# Patient Record
Sex: MALE | Race: WHITE
[De-identification: names, ages, dates, MRNs, and addresses within clinical notes are randomized per-mention and may not be internally consistent; named-entity substitution may affect disease eponyms.]

---

## 2018-04-06 ENCOUNTER — HOSPITAL ENCOUNTER (EMERGENCY)
Dept: HOSPITAL 68 - ERH | Age: 83
End: 2018-04-06
Payer: COMMERCIAL

## 2018-04-06 VITALS — BODY MASS INDEX: 28.63 KG/M2 | HEIGHT: 73 IN | WEIGHT: 216 LBS

## 2018-04-06 VITALS — DIASTOLIC BLOOD PRESSURE: 80 MMHG | SYSTOLIC BLOOD PRESSURE: 182 MMHG

## 2018-04-06 DIAGNOSIS — M25.551: Primary | ICD-10-CM

## 2018-04-06 DIAGNOSIS — K21.9: ICD-10-CM

## 2018-04-06 DIAGNOSIS — I48.91: ICD-10-CM

## 2018-04-06 DIAGNOSIS — Z86.73: ICD-10-CM

## 2018-04-06 DIAGNOSIS — Z95.0: ICD-10-CM

## 2018-04-06 LAB
ABSOLUTE GRANULOCYTE CT: 5.5 /CUMM (ref 1.4–6.5)
APTT BLD: 35 SEC (ref 25–37)
BASOPHILS # BLD: 0.1 /CUMM (ref 0–0.2)
BASOPHILS NFR BLD: 0.9 % (ref 0–2)
EOSINOPHIL # BLD: 0.2 /CUMM (ref 0–0.7)
EOSINOPHIL NFR BLD: 2.8 % (ref 0–5)
ERYTHROCYTE [DISTWIDTH] IN BLOOD BY AUTOMATED COUNT: 13.9 % (ref 11.5–14.5)
GRANULOCYTES NFR BLD: 65.6 % (ref 42.2–75.2)
HCT VFR BLD CALC: 42.2 % (ref 42–52)
LYMPHOCYTES # BLD: 1.9 /CUMM (ref 1.2–3.4)
MCH RBC QN AUTO: 31.9 PG (ref 27–31)
MCHC RBC AUTO-ENTMCNC: 32.4 G/DL (ref 33–37)
MCV RBC AUTO: 98.4 FL (ref 80–94)
MONOCYTES # BLD: 0.7 /CUMM (ref 0.1–0.6)
PLATELET # BLD: 226 /CUMM (ref 130–400)
PMV BLD AUTO: 8.1 FL (ref 7.4–10.4)
PROTHROMBIN TIME: 21 SEC (ref 9.4–12.5)
RED BLOOD CELL CT: 4.29 /CUMM (ref 4.7–6.1)
WBC # BLD AUTO: 8.4 /CUMM (ref 4.8–10.8)

## 2018-04-06 NOTE — CT SCAN REPORT
STUDY PERFORMED:
CTA OF THE CHEST, ABDOMEN AND PELVIS WITH AND WITHOUT CONTRAST
 
CLINICAL INFORMATION:
Further evaluation of abdominal aortic aneurysm.
 
DESCRIPTION:
Initial noncontrast CT of the chest was performed.   Contrast timing was
performed at the level of the distal descending thoracic aorta. Subsequently,
arterial phase multidetector volumetric imaging was performed through the
chest, abdomen and pelvis following the administration of 95 mL Optiray 320
intravenous contrast.
No contrast reaction reported
Sagittal and coronal reformatted images were obtained on the technologist
workstation.
Three-dimensional MIP reformatted imaging was performed and reviewed.
 
Total exam dose-length product 1665 mGy-cm
 
COMPARISON:
Pelvic CT from today. Report of the lumbar spine CT performed 05/22/2016.
 
FINDINGS:
Vascular:
1. Normal origin of the main coronary arteries. The ascending thoracic aorta
is normal in course and caliber without dissection.
2. The aortic arch is normal in course and caliber with normal 3 vessel
branching configuration. No dissection.
3. The descending thoracic aorta is normal in course and caliber without
dissection. Scattered atherosclerotic calcification.
4. The abdominal aortic aneurysm seen on the recent pelvic CT is again
demonstrated. This has a fusiform appearance with maximum dimensions of 4.9
cm AP by 5.4 cm transverse. The prior study from 2016 reports a maximum size
of 4.5 x 4.6 cm, suggesting interval increase. Peripheral noncalcified plaque
is seen at the left anterior aspect of the aneurysm. This originates below
the level of the renal arteries and does not extend into the iliac
vasculature.
5. The bilateral common iliac arteries through the visualized femoral
arteries are normal in caliber with scattered nonocclusive calcified plaque.
6. The celiac axis and superior mesenteric artery are widely patent. Stenosis
at the origin of the inferior mesenteric artery. There are single bilateral
renal arteries which are patent.
7. No central pulmonary embolism.
 
Nonvascular:
CHEST:
The central airways are patent. There is elevation of the left hemidiaphragm
with left basilar atelectasis. No additional consolidation. No pneumothorax
or pleural effusion.
 
The heart is of normal size. Cardiac pacer leads extend to the right atrium
and right ventricle. Trace pericardial effusion. There is no mediastinal,
hilar or axillary lymphadenopathy. Calcified mediastinal lymph nodes noted.
There are no chest wall masses.
 
ABDOMEN AND PELVIS:
LIVER, GALLBLADDER, AND BILIARY TREE: The liver is normal in size, shape, and
attenuation. No focal hepatic lesion or biliary ductal dilatation is present.
The gallbladder is unremarkable with no evidence of radiopaque gallstones,
gallbladder wall thickening, or obvious pericholecystic inflammatory changes.
 
 
PANCREAS: Mild atrophy with no gross abnormality.
 
SPLEEN: Normal in size with scattered tiny calcifications suggesting prior
granulomatous disease.
 
ADRENAL GLANDS: Unremarkable.
 
KIDNEYS AND URETERS: The kidneys are normal in size, shape, and attenuation.
No hydronephrosis, hydroureter, or calculi seen. No perinephric stranding.
Hypoattenuating lesions are seen bilaterally suggestive of small cysts.
 
BLADDER: Unremarkable.
 
GASTROINTESTINAL TRACT: The stomach is unremarkable. The small bowel is
normal in caliber. No obstruction. No colonic wall thickening or inflammatory
change. No free air or free fluid.
 
ABDOMINAL WALL: No significant hernia is appreciated.
 
LYMPH NODES: Normal.
 
PELVIC VISCERA: The prostate and seminal vesicles are unremarkable.
 
OSSEOUS STRUCTURES: No acute or suspicious osseous abnormality. Prior healed
left acetabular fracture again noted. Degenerative changes of the hips.
Advanced degenerative changes of the spine. DISH.
 
IMPRESSION:
Abdominal aortic aneurysm with maximum dimension of 4.9 x 5.4 cm, compared to
4.5 x 4.6 cm reported from the study of 05/22/2016.
 
Elevated left hemidiaphragm with left basilar atelectasis.
 
Findings suggestive of prior granulomatous disease.

## 2018-04-06 NOTE — RADIOLOGY REPORT
EXAMINATION:
XR HIP, RIGHT
 
CLINICAL INFORMATION:
Right hip pain
 
COMPARISON:
06/01/2014
 
TECHNIQUE:
Two views of the right hip.
 
FINDINGS:
Alignment across the hip is anatomic with mild degenerative change noted. No
evidence of acute fracture. Vascular calcification is present.
 
IMPRESSION:
No fracture identified. Mild degenerative changes.

## 2018-04-06 NOTE — ED UPPER/LOWER EXTREMITY COMPL
**See Addendum**
History of Present Illness
 
General
Chief Complaint: Hip Injury
Stated Complaint: PAIN IN RT HIP
Source: patient, family
Exam Limitations: no limitations
 
Vital Signs & Intake/Output
Vital Signs & Intake/Output
 Vital Signs
 
 
Date Time Temp Pulse Resp B/P B/P Pulse O2 O2 Flow FiO2
 
     Mean Ox Delivery Rate 
 
 1910 98.1 61 16 182/80  99 Room Air  
 
 1454 97.9 66 18 102/70  100 Room Air  
 
 1310      95 Room Air  
 
 1207 97.5 64 16 98/61  100 Room Air  
 
 
 
Allergies
Coded Allergies:
No Known Allergies (18)
 
Reconcile Medications
Alprazolam (Xanax) 0.5 MG TABLET   1 TAB PO BIDP PRN anxiety/restless leg  (
Reported)
Ferrous Sulfate 324 MG (65 MG IRON) TABLET.DR   1 TAB PO Q48 SUPPLEMENT  (
Reported)
Furosemide 20 MG TABLET   1 TAB PO DAILY WATER RETENTION  (Reported)
Gabapentin (Neurontin) 100 MG CAPSULE   1 CAP PO BID NEUROPATHY  (Reported)
Multivitamin (Daily Multiple Vitamin) 1 EACH TABLET   1 TAB PO DAILY VITAMIN 
SUPPORT  (Reported)
Polyethylene Glycol 3350 (Miralax) 17 GRAM/DOSE POWDER   17 GM PO DAILY 
constipation
     mix with water, juice, soda, coffee or tea until stools soft and
     regular
Rivaroxaban (Xarelto) 20 MG TABLET   1 TAB PO DAILY A.fib  (Reported)
     Please restart Xarelto in 1 week (16)
Tamsulosin HCl (Flomax) 0.4 MG CAP.ER.24H   0.4 MG PO DAILY prostate
Trazodone HCl 50 MG TABLET   1 TAB PO QPM SLEEP  (Reported)
 
Triage Note:
PT REQESTING X RAY OF R HIP FOR PERSISTENT PAIN
 S/P "FLOPPING" ONTO COMMODE "WITHIN LAST WEEK" PER
 FAMILY. USES WALKER/WHEELCHAIR AT BASELINE. FAMILY
 REPORTS INCREASED DIFFICULTY WITH MOVEMENT AT
 HOME. HAS NOT TAKEN ANYTHING FOR PAIN.
 MEDICATED WITH 650MG TYLENOL IN TRIAGE.
Triage Nurses Notes Reviewed? yes
Onset: Gradual
Duration: week(s): (1)
Timing: no prior history
Severity: moderate
Pain/Injury Location:
Right: Hip. 
HPI:
Patient is an 82-year-old male presenting to the emergency department with chief
complaint of right hip pain 1 week.  Symptoms started after he "clocked" onto 
the commode.  Patient reports that his headache throbbing pain since then.  Pain
worse with weightbearing and movement.  Has not taken anything over-the-counter 
to help with symptoms.  Denies any numbness or tingling.  No radiation of pain. 
Denies any scrotal pain.  No urinary frequency or urgency or dysuria.  Patient 
came in today for evaluation of hip pain and to get imaging to make sure there 
is no fractures.  He has been ambulating at home but not as well as he is used 
to.  Denies abdominal pain.  Has been eating well.
(Danielle Ford)
 
Past History
 
Travel History
Traveled to [place] past 21 day No
 
Medical History
Any Pertinent Medical History? see below for history
Neurological: TIA
EENT: NONE
Cardiovascular: AFIB, PACEMAKER PLACEMENT ARRHYTHMIA 1ST DEGREE HEARTBLOCK
Respiratory: obstructive sleep apnea
Gastrointestinal: GERD
Hepatic: NONE
Renal: benign prost hyperplasia
Musculoskeletal: RESTLESS LEG SYNDROME
Psychiatric: NONE
Endocrine: NONE
Blood Disorders: NONE
Cancer(s): NONE
GYN/Reproductive: NONE
Other Medical Hx:
BPH
History of MRSA: No
History of VRE: No
History of CDIFF: No
 
Surgical History
Surgical History: PACEMAKER carpal tunnel release
 
Psychosocial History
Who do you live with Family
Services at Home None
What is your primary language English
Tobacco Use: Never used
 
Family History
Family History, If Any:
MOTHER
  FHx: heart disease
BROTHER
  FH: myocardial infarction
 
Hx Contributory? No
(Danielle Ford)
 
Review of Systems
 
Review of Systems
Constitutional:
Reports: no symptoms. 
Comments
Review of systems: See HPI, All other systems negative.
Constitutional, no chills fever or weight loss
HEENT: No visual changes no sore throat no congestion
Cardiovascular: No chest pain ,palpitation , orthopnea or ankle swelling
Skin, no jaundice no rashes
Respiratory: No dyspnea cough sputum or hemoptysis
GI: No nausea no vomiting
: No dysuria No hematuria
Muscle skeletal: no back pain, no neck pain,
Neurologic: No numbness no confusion, no headaches
Psych: No stress anxiety or depression,.
Heme/endocrine: No bruising no bleeding no polyuria or polydipsia
Immunology: No splenectomy or history of AIDS
(Danielle Ford)
 
Physical Exam
 
Physical Exam
General Appearance: well developed/nourished, no apparent distress, alert, awake
, comfortable
Comments:
Well-developed well-nourished person in no acute distress
HEENT: Atraumatic, normocephalic
Neck: Soft collar neck, secondary to old injury.
Back: Nontender,Full range of motion
Cardiovascular: Pedal pulses are 2+ bilaterally.
Respiratory:  No respiratory distress
Abdomen: Soft, nontender nondistended, no appreciable organomegaly. Normal bowel
sounds. No ascites, no rebound or guarding.
Extremity: No edema, no calf tenderness to palpation, normal and equal pulses.  
Tender to palpation over the right hip area, tenderness with right hip 
adduction.  Able to perform straight leg raise on the right leg without 
difficulties.  Pedal pulses are 2+ bilaterally.
Neuro: Alert oriented x3, motor sensory normal, patellar reflexes are 1+ 
bilaterally.
Skin: No appreciable rash on exposed skin, skin is warm and dry.
Psych: Mood and affect is normal, memory and judgment is normal.
(Heidi BONILLA,Danielle)
 
Progress
Differential Diagnosis: contusion, dislocation, fracture, sprain
Plan of Care:
 Orders
 
 
Procedure Date/time Status
 
EKG  1827 Active
 
PARTIAL THROMBOPLASTIN TIME  161 Complete
 
PROTHROMBIN TIME  1619 Complete
 
COMPREHENSIVE METABOLIC PANEL  1619 Complete
 
CBC WITHOUT DIFFERENTIAL  1619 Complete
 
 
 Laboratory Tests
 
 
 
18 1647:
Anion Gap 13, Estimated GFR > 60, BUN/Creatinine Ratio 24.3, Glucose 95, Calcium
9.0, Total Bilirubin 1.0, AST 23, ALT 21, Alkaline Phosphatase 149  H, Total 
Protein 6.8, Albumin 3.8, Globulin 3.0, Albumin/Globulin Ratio 1.3, PT 21.0  H, 
INR 1.91  H, APTT 35, CBC w Diff NO MAN DIFF REQ, RBC 4.29  L, MCV 98.4  H, MCH 
31.9  H, MCHC 32.4  L, RDW 13.9, MPV 8.1, Gran % 65.6, Lymphocytes % 22.1, 
Monocytes % 8.6, Eosinophils % 2.8, Basophils % 0.9, Absolute Granulocytes 5.5, 
Absolute Lymphocytes 1.9, Absolute Monocytes 0.7  H, Absolute Eosinophils 0.2, 
Absolute Basophils 0.1
 
 
2018 5:09:53 PM spoke with , vascular surgeon on call.  Recommending 
since patient is asymptomatic and if the rest of the imaging reveals no 
abdominal aortic aneurysm greater than 5.5 cm patient should follow-up in his 
office.  Patient will be seen by the surgical physician assistant in the 
emergency department.  Patient family informed of all imaging results.  Prior to
today patient did not know about abdominal aortic aneurysm.  Has not been 
followed. WILL FOLLOW UP WITH DR. RINALDI, VASCULAR. 
 
 
Repeat blood pressures prior to discharge reveal less than 10 mmHg difference in
systolic pressure bilaterally.  Blood pressures were done manually.  Patient 
will follow up with cardiologist and vascular surgeon.  Patient is nontoxic.  
She Thinks His Symptoms Return.  Patient Was Seen and Evaluated by Dr. RICHARDS as 
well and he agrees to plan.
 
Diagnostic Imaging:
Viewed by Me: Radiology Read, CT Scan.  Discussed w/RAD: Radiology Read, CT 
Scan. 
Radiology Impression: PATIENT: MANUEL SOSA  MEDICAL RECORD NO: [id number] PRESENT 
AGE: 82  PATIENT ACCOUNT NO: [id number] : 35  LOCATION: [facility] ORDERING 
PHYSICIAN: Danielle BONILLA     SERVICE DATE:  EXAM TYPE: RAD - 
XRY-HIP 2-3 VIEWS, RIGHT EXAMINATION: XR HIP, RIGHT CLINICAL INFORMATION: Right 
hip pain COMPARISON: 2014 TECHNIQUE: Two views of the right hip. FINDINGS:
Alignment across the hip is anatomic with mild degenerative change noted. No 
evidence of acute fracture. Vascular calcification is present. IMPRESSION: No 
fracture identified. Mild degenerative changes. DICTATED BY: Rashawn Malloy MD  
DATE/TIME DICTATED:18 :RAD.CHONG  DATE/TIME 
TRANSCRIBED:18 CONFIDENTIAL, DO NOT COPY WITHOUT APPROPRIATE 
AUTHORIZATION.  <Electronically signed in Other Vendor System>                  
                                                                     SIGNED BY: 
Rashawn Malloy MD 18 1417, PATIENT: MANUEL SOSA  MEDICAL RECORD NO: 
[id number] PRESENT AGE: 82  PATIENT ACCOUNT NO: [id number] : 35  LOCATION: [facility]
ORDERING PHYSICIAN: Danielle BONILLA     SERVICE DATE: 1421 EXAM 
TYPE: CAT - CT PELVIS WO IV CONTRAST EXAMINATION: CT PELVIS WITHOUT CONTRAST 
CLINICAL INFORMATION: Pain status post falling onto commode. Difficulty walking.
COMPARISON: Report from 08/10/2015. Additional report from 2016 TECHNIQUE:
Helical scanning was performed with submillimeter collimation through the 
pelvis. Sagittal and coronal multiplanar 2-D reconstructions were obtained. DLP:
904 mGy-cm FINDINGS: PELVIS: Multiple bladder diverticula are present. Colonic 
diverticulosis without diverticulitis. The prostate and seminal vesicles are 
unremarkable. Partially visualized abdominal aortic aneurysm which measures 4.9 
cm AP by 5.2 cm transverse. Prior images are not available for comparison, 
although the reported size on the study from 2016 was 4.5 x 4.6 cm. 
OSSEOUS STRUCTURES: Evidence of prior healed left acetabular fracture. The bones
are osteopenic. There is no acute fractures seen. The femoral heads are well-
seated within their acetabula. Mild to moderate degenerative changes of both 
hips with joint space narrowing and osteophyte formation. Sacroiliac joints and 
pubic symphysis are appropriately aligned. Degenerative changes are noted at the
lower lumbar spine. IMPRESSION: No evidence of acute fracture or malalignment. 
Mild to moderate degenerative changes of both hips. Healed chronic left 
acetabular fracture. Redemonstration of an abdominal aortic aneurysm 
demonstrating slight increase in size since the 2016 study. This is not fully 
imaged on this study, with the visualized portion measuring up to 4.9 x 5.2 cm. 
DICTATED BY: Jabier Wright MD  DATE/TIME DICTATED:18 
:RAD.HCONG  DATE/TIME TRANSCRIBED:18 CONFIDENTIAL, 
DO NOT COPY WITHOUT APPROPRIATE AUTHORIZATION.  <Electronically signed in Other 
Vendor System>                       , PATIENT: MANUEL SOSA  MEDICAL RECORD NO:
[id number] PRESENT AGE: 82  PATIENT ACCOUNT NO: [id number] : 35  LOCATION: [facility]
ORDERING PHYSICIAN: Danielle BONILLA     SERVICE DATE:  EXAM 
TYPE: CAT - CT ABD & PELVIS ANGIOGRAM; CTA CHEST STUDY PERFORMED: CTA OF THE 
CHEST, ABDOMEN AND PELVIS WITH AND WITHOUT CONTRAST CLINICAL INFORMATION: 
Further evaluation of abdominal aortic aneurysm. DESCRIPTION: Initial 
noncontrast CT of the chest was performed.   Contrast timing was performed at 
the level of the distal descending thoracic aorta. Subsequently, arterial phase 
multidetector volumetric imaging was performed through the chest, abdomen and 
pelvis following the administration of 95 mL Optiray 320 intravenous contrast. 
No contrast reaction reported Sagittal and coronal reformatted images were 
obtained on the technologist workstation. Three-dimensional MIP reformatted 
imaging was performed and reviewed. Total exam dose-length product 1665 mGy-cm 
COMPARISON: Pelvic CT from today. Report of the lumbar spine CT performed 2016. FINDINGS: Vascular: 1. Normal origin of the main coronary arteries. The 
ascending thoracic aorta is normal in course and caliber without dissection. 2. 
The aortic arch is normal in course and caliber with normal 3 vessel branching 
configuration. No dissection. 3. The descending thoracic aorta is normal in 
course and caliber without dissection. Scattered atherosclerotic calcification. 
4. The abdominal aortic aneurysm seen on the recent pelvic CT is again 
demonstrated. This has a fusiform appearance with maximum dimensions of 4.9 cm 
AP by 5.4 cm transverse. The prior study from 2016 reports a maximum size of 4.5
x 4.6 cm, suggesting interval increase. Peripheral noncalcified plaque is seen 
at the left anterior aspect of the aneurysm. This originates below the level of 
the renal arteries and does not extend into the iliac vasculature. 5. The 
bilateral common iliac arteries through the visualized femoral arteries are 
normal in caliber with scattered nonocclusive calcified plaque. 6. The celiac 
axis and superior mesenteric artery are widely patent. Stenosis at the origin of
the inferior mesenteric artery. There are single bilateral renal arteries which 
are patent. 7. No central pulmonary embolism. Nonvascular: CHEST: The central 
airways are patent. There is elevation of the left hemidiaphragm with left 
basilar atelectasis. No additional consolidation. No pneumothorax or pleural 
effusion. The heart is of normal size. Cardiac pacer leads extend to the right 
atrium and right ventricle. Trace pericardial effusion. There is no mediastinal,
hilar or axillary lymphadenopathy. Calcified mediastinal lymph nodes noted. 
There are no chest wall masses. ABDOMEN AND PELVIS: LIVER, GALLBLADDER, AND 
BILIARY TREE: The liver is normal in size, shape, and attenuation. No focal 
hepatic lesion or biliary ductal dilatation is present. The gallbladder is 
unremarkable with no evidence of radiopaque gallstones, gallbladder wall 
thickening, or obvious pericholecystic inflammatory changes. PANCREAS: Mild 
atrophy with no gross abnormality. SPLEEN: Normal in size with scattered tiny 
calcifications suggesting prior granulomatous disease. ADRENAL GLANDS: 
Unremarkable. KIDNEYS AND URETERS: The kidneys are normal in size, shape, and 
attenuation. No hydronephrosis, hydroureter, or calculi seen. No perinephric 
stranding. Hypoattenuating lesions are seen bilaterally suggestive of small 
cysts. BLADDER: Unremarkable. GASTROINTESTINAL TRACT: The stomach is 
unremarkable. The small bowel is normal in caliber. No obstruction. No colonic 
wall thickening or inflammatory change. No free air or free fluid. ABDOMINAL 
WALL: No significant hernia is appreciated. LYMPH NODES: Normal. PELVIC VISCERA:
The prostate and seminal vesicles are unremarkable. OSSEOUS STRUCTURES: No acute
or suspicious osseous abnormality. Prior healed left acetabular fracture again 
noted. Degenerative changes of the hips. Advanced degenerative changes of the 
spine. DISH. IMPRESSION: Abdominal aortic aneurysm with maximum dimension of 4.9
x 5.4 cm, compared to 4.5 x 4.6 cm reported from the study of 2016. 
Elevated left hemidiaphragm with left basilar atelectasis. Findings suggestive 
of prior granulomatous disease. DICTATED BY: Jabier Wright MD  DATE/TIME 
DICTATED:18 :RAD.CHONG  DATE/TIME TRANSCRIBED:1839 CONFIDENTIAL, DO NOT COPY WITHOUT APPROPRIATE AUTHORIZATION.  <
Electronically signed in Other Vendor System>                                   
                                                    SIGNED BY: Adriana MCKEON,
Jabier 18 535
(Danielle Ford)
 
Departure
 
Departure
Time of Disposition: 1615
Disposition: HOME OR SELF CARE
Condition: Stable
Clinical Impression
Primary Impression: Hip pain
Qualifiers:  Laterality: right Qualified Code: M25.551 - Pain in right hip
Secondary Impressions: Abdominal aneurysm
Referrals:
Niko MCKEON,Willis Brito MD,MICAH Rendon (PCP/Family)
 
Additional Instructions:
Follow-up with vascular surgeon , call Monday morning to make an 
appointment.  They will continue to follow abdominal aortic aneurysm.  Attaches 
a copy of your CAT scan results for your records.  Return to the emergency 
department for any worsening symptoms or concerns.  The vascular surgeon 
suggested that they monitor aneurysms until they reach 5.5 cm.  This will be 
continued to be monitored with ultrasound likely.  Return if he develop any 
abdominal pain worsening symptoms or concerns.  Take Tylenol over-the-counter as
directed for pain in the hip.  Avoid heavy lifting.
 
 
 
 
 
 
 
 
 
PATIENT: MANUEL SOSA  MEDICAL RECORD NO: [id number]
PRESENT AGE: 82  PATIENT ACCOUNT NO: [id number]
: 35  LOCATION: [facility]
ORDERING PHYSICIAN: Danielle BONILLA  
 
  SERVICE DATE: 
EXAM TYPE: CAT - CT ABD & PELVIS ANGIOGRAM; CTA CHEST
 
STUDY PERFORMED:
CTA OF THE CHEST, ABDOMEN AND PELVIS WITH AND WITHOUT CONTRAST
 
CLINICAL INFORMATION:
Further evaluation of abdominal aortic aneurysm.
 
DESCRIPTION:
Initial noncontrast CT of the chest was performed.   Contrast timing was
performed at the level of the distal descending thoracic aorta. Subsequently,
arterial phase multidetector volumetric imaging was performed through the
chest, abdomen and pelvis following the administration of 95 mL Optiray 320
intravenous contrast.
No contrast reaction reported
Sagittal and coronal reformatted images were obtained on the technologist
workstation.
Three-dimensional MIP reformatted imaging was performed and reviewed.
 
Total exam dose-length product 1665 mGy-cm
 
COMPARISON:
Pelvic CT from today. Report of the lumbar spine CT performed 2016.
 
FINDINGS:
Vascular:
1. Normal origin of the main coronary arteries. The ascending thoracic aorta
is normal in course and caliber without dissection.
2. The aortic arch is normal in course and caliber with normal 3 vessel
branching configuration. No dissection.
3. The descending thoracic aorta is normal in course and caliber without
dissection. Scattered atherosclerotic calcification.
4. The abdominal aortic aneurysm seen on the recent pelvic CT is again
demonstrated. This has a fusiform appearance with maximum dimensions of 4.9
cm AP by 5.4 cm transverse. The prior study from  reports a maximum size
of 4.5 x 4.6 cm, suggesting interval increase. Peripheral noncalcified plaque
is seen at the left anterior aspect of the aneurysm. This originates below
the level of the renal arteries and does not extend into the iliac
vasculature.
5. The bilateral common iliac arteries through the visualized femoral
arteries are normal in caliber with scattered nonocclusive calcified plaque.
6. The celiac axis and superior mesenteric artery are widely patent. Stenosis
at the origin of the inferior mesenteric artery. There are single bilateral
renal arteries which are patent.
7. No central pulmonary embolism.
 
Nonvascular:
CHEST:
The central airways are patent. There is elevation of the left hemidiaphragm
with left basilar atelectasis. No additional consolidation. No pneumothorax
or pleural effusion.
 
The heart is of normal size. Cardiac pacer leads extend to the right atrium
and right ventricle. Trace pericardial effusion. There is no mediastinal,
hilar or axillary lymphadenopathy. Calcified mediastinal lymph nodes noted.
There are no chest wall masses.
 
ABDOMEN AND PELVIS:
LIVER, GALLBLADDER, AND BILIARY TREE: The liver is normal in size, shape, and
attenuation. No focal hepatic lesion or biliary ductal dilatation is present.
The gallbladder is unremarkable with no evidence of radiopaque gallstones,
gallbladder wall thickening, or obvious pericholecystic inflammatory changes.
 
 
PANCREAS: Mild atrophy with no gross abnormality.
 
SPLEEN: Normal in size with scattered tiny calcifications suggesting prior
granulomatous disease.
 
ADRENAL GLANDS: Unremarkable.
 
KIDNEYS AND URETERS: The kidneys are normal in size, shape, and attenuation.
No hydronephrosis, hydroureter, or calculi seen. No perinephric stranding.
Hypoattenuating lesions are seen bilaterally suggestive of small cysts.
 
BLADDER: Unremarkable.
 
GASTROINTESTINAL TRACT: The stomach is unremarkable. The small bowel is
normal in caliber. No obstruction. No colonic wall thickening or inflammatory
change. No free air or free fluid.
 
ABDOMINAL WALL: No significant hernia is appreciated.
 
LYMPH NODES: Normal.
 
PELVIC VISCERA: The prostate and seminal vesicles are unremarkable.
 
OSSEOUS STRUCTURES: No acute or suspicious osseous abnormality. Prior healed
left acetabular fracture again noted. Degenerative changes of the hips.
Advanced degenerative changes of the spine. DISH.
 
IMPRESSION:
Abdominal aortic aneurysm with maximum dimension of 4.9 x 5.4 cm, compared to
4.5 x 4.6 cm reported from the study of 2016.
 
Elevated left hemidiaphragm with left basilar atelectasis.
 
Findings suggestive of prior granulomatous disease.
 
 
 
DICTATED BY: Jabier Wright MD 
DATE/TIME DICTATED:18
:SALLY 
DATE/TIME TRANSCRIBED:18
 
CONFIDENTIAL, DO NOT COPY WITHOUT APPROPRIATE AUTHORIZATION.
 
 <Electronically signed in Other Vendor System>                                 
          
                                           SIGNED BY: Adriana MCKEON,Jabier 
 
 
Departure Forms:
Customer Survey
General Discharge Information
(Danielle Ford)
 
PA/NP Co-Sign Statement
Statement:
ED Attending supervision documentation-
 
[X] I saw and evaluated the patient. I have also reviewed all the pertinent lab 
results and diagnostic results. I agree with the findings and the plan of care 
as documented in the PA's/NP's documentation.  Patient presents for evaluation 
of hip pain.  Physical examination reveals grossly normal range of motion and 
patient's lower extremities are neurovascularly intact.
 
[] I have reviewed the ED Record and agree with the PA's/NP's documentation.
 
[] Additions or exceptions (if any) to the PAs/NP's note and plan are 
summarized below:
[]
 
(Pura MCKEON,João MCKEON)

## 2018-04-06 NOTE — PN- VASCULAR SURGERY
Surgical Brief Attending Note
Brief Attending Note:
Pt is an 83 yo M with a hx of Afib (on xarelto), permanent pacemaker, ARIADNE, TIA, 
BPH, and restelss leg syndrome, who presented to the ED with c/o hip pain x 1 
week. Xrays performed in the ED were inconclusive, so CT was performed and 
revealed no acute fracture. Workup revealed incidental finding of abdominal 
aortic aneurysm, measuring 5.2 cm in diameter. Comparison films from 5/22/16 
indicate enlargement from 4.6 cm in diameter. Vascular surgery consultation is 
being requested for recommendations. 
 
At this time, patient remains asymptomatic from a cardiovascular standpoint.  He
denies headache, dizziness, chest pain, nausea, vomiting, shortness of breath.  
He states that he is not aware of the presence of the aneurysm, although he does
see a cardiologist. (He is not sure of the name.)  
 
Recommendations: 
-No emergent surgical intervention is indicated at this time. 
-Given the absence of active symptoms, surgical intervention would be considered
for aneurysms >5.5 cm. 
-Pt should f/u with Dr. Pope in the office for close monitoring.

## 2018-04-06 NOTE — CT SCAN REPORT
EXAMINATION:
CT PELVIS WITHOUT CONTRAST
 
CLINICAL INFORMATION:
Pain status post falling onto commode. Difficulty walking.
 
COMPARISON:
Report from 08/10/2015. Additional report from 05/22/2016
 
TECHNIQUE:
Helical scanning was performed with submillimeter collimation through the
pelvis. Sagittal and coronal multiplanar 2-D reconstructions were obtained.
 
DLP:
904 mGy-cm
 
FINDINGS:
 
PELVIS: Multiple bladder diverticula are present. Colonic diverticulosis
without diverticulitis. The prostate and seminal vesicles are unremarkable.
Partially visualized abdominal aortic aneurysm which measures 4.9 cm AP by
5.2 cm transverse. Prior images are not available for comparison, although
the reported size on the study from 05/22/2016 was 4.5 x 4.6 cm.
 
OSSEOUS STRUCTURES: Evidence of prior healed left acetabular fracture. The
bones are osteopenic. There is no acute fractures seen. The femoral heads are
well-seated within their acetabula. Mild to moderate degenerative changes of
both hips with joint space narrowing and osteophyte formation. Sacroiliac
joints and pubic symphysis are appropriately aligned. Degenerative changes
are noted at the lower lumbar spine.
 
IMPRESSION:
No evidence of acute fracture or malalignment. Mild to moderate degenerative
changes of both hips. Healed chronic left acetabular fracture.
 
Redemonstration of an abdominal aortic aneurysm demonstrating slight increase
in size since the 2016 study. This is not fully imaged on this study, with
the visualized portion measuring up to 4.9 x 5.2 cm.

## 2018-05-14 ENCOUNTER — HOSPITAL ENCOUNTER (INPATIENT)
Dept: HOSPITAL 68 - SDA | Age: 83
LOS: 1 days | DRG: 269 | End: 2018-05-15
Attending: SURGERY | Admitting: SURGERY
Payer: COMMERCIAL

## 2018-05-14 VITALS — DIASTOLIC BLOOD PRESSURE: 68 MMHG | SYSTOLIC BLOOD PRESSURE: 118 MMHG

## 2018-05-14 VITALS — WEIGHT: 204 LBS | BODY MASS INDEX: 27.04 KG/M2 | HEIGHT: 73 IN

## 2018-05-14 VITALS — DIASTOLIC BLOOD PRESSURE: 70 MMHG | SYSTOLIC BLOOD PRESSURE: 110 MMHG

## 2018-05-14 VITALS — DIASTOLIC BLOOD PRESSURE: 60 MMHG | SYSTOLIC BLOOD PRESSURE: 122 MMHG

## 2018-05-14 VITALS — DIASTOLIC BLOOD PRESSURE: 60 MMHG | SYSTOLIC BLOOD PRESSURE: 102 MMHG

## 2018-05-14 DIAGNOSIS — E78.5: ICD-10-CM

## 2018-05-14 DIAGNOSIS — I71.4: Primary | ICD-10-CM

## 2018-05-14 DIAGNOSIS — Z79.01: ICD-10-CM

## 2018-05-14 DIAGNOSIS — Z79.82: ICD-10-CM

## 2018-05-14 DIAGNOSIS — I48.91: ICD-10-CM

## 2018-05-14 PROCEDURE — C1760 CLOSURE DEV, VASC: HCPCS

## 2018-05-14 PROCEDURE — C1725 CATH, TRANSLUMIN NON-LASER: HCPCS

## 2018-05-14 PROCEDURE — 04V03DZ RESTRICTION OF ABDOMINAL AORTA WITH INTRALUMINAL DEVICE, PERCUTANEOUS APPROACH: ICD-10-PCS | Performed by: SURGERY

## 2018-05-14 NOTE — RADIOLOGY REPORT
EXAMINATION:
CR ABDOMEN/INTRAOPERATIVE FLUOROSCOPY
 
CLINICAL INDICATION:
Endovascular AAA revision in OR.
 
COMPARISON:
CT scan of the abdomen and pelvis dated 04/06/2018.
 
TECHNIQUE/FINDINGS:
Fluoroscopic equipment was dedicated to the operating room for the
performance of an intraoperative procedure. Several  (5) spot films and 6
cine fluoroscopy runs were acquired and are archived in PACS. Please refer to
operative notes for procedural detail.
 
FLUOROSCOPY TIME:
12 minutes 2 seconds.
 
IMPRESSION:
Administrative dictation for intraoperative fluoroscopy and image archiving
in PACS. Please refer to operative notes for details.

## 2018-05-14 NOTE — OPERATIVE REPORT
Operative/Inv Procedure Report
Surgery Date: 05/14/18
Name of Procedure:
-Ultrasound-guided bilateral common femoral artery access
- Endovascular repair of abdominal aortic aneurysm with Madison Excluder.  Main 
body measuring 26 x 14 x 12 mm right limb measuring 18 x 100 mm left limb 
measuring 20 x 120mm
- Bilateral common femoral closure with Perclose device
Pre-Operative Diagnosis:
Asymptomatic infrarenal abdominal aortic aneurysm
Post-Operative Diagnosis:
Same
Estimated Blood Loss: scant
Surgeon/Assistant:
Jerrod Hernandez MD/ Bipin Cantor
 
Anesthesia: general endotracheal tube
 
Operative/Procedure Note
Note:
82-year-old gentleman history of asymptomatic infrarenal abdominal aortic 
aneurysm who has been followed with imaging.  Recently his aneurysm has grown to
5.4 cm.  The patient was offered elective endovascular repair of the AAA.  The 
nature of the procedure including is possible complications which included but 
not limited to bleeding, infection, blood clots, heart attack, stroke, injury to
vessels, need to convert to open, and need for re-intervention were discussed.  
An informed consent was obtained.
 
Dr. Voss assisted me in this operation as there was no qualified resident or PA
to assist.
 
Patient was taken to the operating room and placed supine on the table.  A 
timeout was called according to protocol.  Perioperative antibiotics had been 
started.  After satisfactory induction of anesthesia, the patient was prepped 
and draped in standard surgical fashion.  Using an ultrasound, bilateral common 
femoral artery was accessed using micropuncture technique.  2 Perclose device 
were placed on each puncture site.  6 St Helenian sheath was placed in bilateral 
groins.  A marked pigtail catheter was advanced over the wire left side and 
advanced into the abdominal aorta.  From this position, an aortogram was 
performed to identify the renal arteries and tortuous iliac arteries 
bilaterally.  On the right side, the Bentson was exchanged with a Lunderquist 
wire.  8000 units of heparin was given.  A 14 St Helenian sheath was advanced over 
the Lunderquist wire on the right side on the direct thorascopic guidance.  The 
main body of the Madison Excluder was advanced over the Lunderquist wire and 
deployed below the renal arteries.  From the left axis sheath, contralateral 
gate was cannulated using glide catheter and Glidewire.  A pigtail catheter was 
advanced over the wire.  The glide wire was removed and intraluminal position is
confirmed by rotating the pigtail catheter within the graft.  Then a Amplatz 
wire was advanced into the pigtail catheter and advanced into the aorta.  The 
pigtail catheter was removed.  Then a 12 St Helenian Madison sheath was advanced over 
the stiff wire on the left side under direct fluoroscopic guidance.  Then a 
retrograde angiogram was performed through the 12 St Helenian left sheath.  The 
hypogastric artery was marked.  The length was then measured and a 20 x 120 mm 
limb was applied just above the hypogastric artery.  Then a retrograde angiogram
through the right sheath was also performed and the takeoff of the hypogastric 
was marked.  An 18 x 100 mm right iliac limb was then deployed which landed just
above the hypogastric artery.  Using the Madison balloon, the proximal seal zone as
well as the graft overlapped and distal seal zones were gently angioplastied.  
Completion angiogram through the pigtail above the graft while pulling on 
bilateral femoral sheath showed no evidence of endoleak.  Wires and catheters 
were removed.  Bilateral groin puncture site were then closed using previously 
placed Perclose devices.  5 minutes of manual pressure was applied.  Sterile 
dressing was then applied.  The patient was awakened and the demonstrated 
palpable pedal pulses.  The patient was then taken to the recovery room in 
stable condition.

## 2018-05-14 NOTE — PN- GENERAL SURGERY
Subjective
Subjective:
83 y/o male feels well without complaints 3 hours S/P endovascular infrarenal 
aneurysm repair. He is hungry and complains of restless legs
 
Review of Systems
Constitutional:
Denies: fever, weakness. 
Cardiovascular:
Denies: chest pain, palpitations, peripheral edema. 
Respiratory:
Denies: cough, short of breath. 
Gastrointestinal:
Denies: abdominal pain, distention. 
Musculoskeletal:
Reports: neck pain (recent c-spine surgery). 
 
Objective
Vital Signs and I&Os
Vital Signs
 
 
Date Time Temp Pulse Resp B/P B/P Pulse O2 O2 Flow FiO2
 
     Mean Ox Delivery Rate 
 
05/14 1200 95.5 60 18 118/68  100 Nasal 2.0L 
 
       Cannula  
 
05/14 1150      100 Nasal 2.0L 
 
       Cannula  
 
 
 Intake & Output
 
 
 05/14 1600 05/14 0800 05/14 0000 05/13 1600 05/13 0800 05/13 0000
 
Intake Total      
 
Output Total      
 
Balance      
 
       
 
Patient 204 lb     
 
Weight      
 
Weight Bed scale     
 
Measurement      
 
Method      
 
 
 
Physical Exam:
chest - CTA symmetric 
heart- RRR without MRG
Abd -flat without distention, Non tender to palpation.
pos BS
bilateral groin area with dressings - dry without drainage
no evidence of hematoma
palpable 2+ femoral and DP pulses, feet warm
calves soft without edema
Current Medications:
 Current Medications
 
 
  Sig/Bonnie Start time  Last
 
Medication Dose Route Stop Time Status Admin
 
Acetaminophen 1,000 MG Q6P PRN 05/14 1045 AC 
 
  IV   
 
Aspirin 81 MG DAILY 05/15 0900 CAN 
 
  PO   
 
Dextrose/Sodium  1,000 ML .[id number] 05/14 1045 AC 05/14
 
Chloride  IV   1145
 
Furosemide 20 MG DAILY 05/15 0900 AC 
 
  PO   
 
Gabapentin 100 MG BID 05/14 2100 AC 
 
  PO   
 
Heparin Sodium  5,000 UNIT Q8 05/14 1400 AC 05/14
 
(Porcine)  SC   1343
 
Ondansetron HCl 4 MG Q6P PRN 05/14 1045 AC 
 
  IV   
 
Tamsulosin HCl 0.4 MG DAILY 05/15 0900 AC 
 
  PO   
 
Trazodone HCl 50 MG QPM 05/14 2100 AC 
 
  PO   
 
 
 
 
Assessment/Plan
Assessment/Plan
POC 83 y/o male S/P Endo vasc AAA repair
 
4 hours bedrest with NVI checks
plan to continue xarelto tonight
advance diet.
Patient states he has been wearing a soft collar for his neck. He had surgery 
last year for cervical stenosis , posterior laminectomy, by Dr. Rosales. He 
fell and injured his neck shortly after his surgery. since then has been wearing
a collar ever since and yesterday had a cat scan of his neck. We will consult 
Dr. Rosales for further guidance
 
 
Core Measures
 
Venous Thromboembolism
VTE Risk Factors Immobility
No Mechanical VTE Prophylaxis d/t Early Ambulation
No VTE Pharm Prophylaxis d/t Other

## 2018-05-14 NOTE — PATIENT DISCHARGE INSTRUCTIONS
**See Addendum**
Discharge Instructions
 
General Discharge Information
You were seen/treated for:
abdominal aortic aneuysm
You had these procedures:
endovacular AAA repair
Watch for these problems:
fullness and discomfort in the groin area, hematoma developing, redness or 
drainage from the groin area, fevers or chills
Daily wet to dry dressings: No
No bath, but you may shower: Yes
Other wound care:
keep area covered and dry for 2 days
Special Instructions:
no strenuous activity or heavy lifting until cleared by your surgeon
 
Diet
Continue normal diet: Yes
 
Activity
Full Activity/No Limits: No
Activity Self Limited: Yes
Activity Limited to: Weight bear as tolerated
 
Acute Coronary Syndrome
 
Inclusion Criteria
At DC or during hospital stay patient has or had the following:
ACS DIAGNOSIS No
 
Discharge Core Measures
Meds if any: Prescribed or Continued at Discharge
ACE/ARB if EF <40% No
Aspirin Yes
Beta-Blocker No
Meds if any: NOT Prescribed or Continued at Discharge
 
Congestive Heart Failure
 
Inclusion Criteria
At DC or during hospital stay patient has or had the following:
CHF DIAGNOSIS No
 
Discharge Core Measures
Meds if any: Prescribed or Continued at Discharge
Meds if any: NOT Prescribed or Continued at Discharge
 
Cerebrovascular accident
 
Inclusion Criteria
At DC or during hospital stay patient has or had the following:
CVA/TIA Diagnosis No
 
Discharge Core Measures
Meds if any: Prescribed or Continued at Discharge
Meds if any: NOT Prescribed or Continued at Discharge
 
Venous thromboembolism
 
Inclusion Criteria
VTE Diagnosis No
VTE Type NONE
VTE Confirmed by (Test) NONE
 
Discharge Core Measures
- Per Current guidelines, there needs to be overlap
- treatment for the first 5 days of Warfarin therapy.
- If discharged on Warfarin prior to 5 days of
- overlap therapy, the patient will need to be
- assessed for post discharge needs including
- *Post discharge parental anticoagulation
- *Warfarin and/or parental anticoagulation education
- *Follow up date to check INR post discharge
At least 5 days overlap therapy as Inpatient No
Why was Parental Med stopped Increased risk, bleeding
Meds if any: Prescribed or Continued at Discharge
Warfarin No
Note: Overlap Therapy is Warfarin and Anticoagulant
Meds if any: NOT Prescribed or Continued at Discharge

## 2018-05-14 NOTE — SURG SHORT-STAY <48HRS DIS SUM
Visit Information
 
Visit Dates
Admission Date:
05/14/18
 
Discharge Date:
5/15/18
 
 
Surgical Short Stay DC Summary
Admission Diagnosis:
infrarenal AAA
Final Diagnosis:
same
Procedure(s):
endovascular infrarenal AAA repair
Summary/Significant Findings:
83 y/o male was taken to the OR on 5/14/2018 and underwent endovascular AAA 
repair without incident. he was on xarelto preop for Afib and this was held 
prior to the procedure. He had a short stay in the pacu and then transferred to 
the floor. While on the floor his VVS have remained stable and he is afebrile. 
His pain is controlled and he is able to tolarate POs and void without incident.
He has been started back on his xarelto and is cleared for D/C home
Condition at Discharge:
stable
Discharge Disposition: home health services
Discharge instructions provided to patient/family: Yes
Post discharge follow-up plan:
follow-up with Dr. Hernandez
Copies to:
Jerrod Hernandez MD

## 2018-05-15 VITALS — SYSTOLIC BLOOD PRESSURE: 110 MMHG | DIASTOLIC BLOOD PRESSURE: 63 MMHG

## 2018-05-15 VITALS — SYSTOLIC BLOOD PRESSURE: 110 MMHG | DIASTOLIC BLOOD PRESSURE: 75 MMHG

## 2018-05-15 VITALS — SYSTOLIC BLOOD PRESSURE: 100 MMHG | DIASTOLIC BLOOD PRESSURE: 60 MMHG

## 2018-05-15 NOTE — PN- VASCULAR SURGERY
Subjective
Subjective:
PT IN BED WITH NO COMPLAINTS.  DENIES PAIN, DENIES CP/SOB, ABD PAIN OR BACK 
PAIN.  NOT WEARING NECK COLLAR.  VOIDING.  HAS NOT AMBULATED, USUALLY USES A 
WALKER AT HOME. TOLERATING DIET
 
Objective
Vital Signs and I&Os
Vital Signs
 
 
Date Time Temp Pulse Resp B/P B/P Pulse O2 O2 Flow FiO2
 
     Mean Ox Delivery Rate 
 
05/15 0402 98.0 59 20 110/63  97   
 
05/15 0021 97.8 60 18 110/75  97 Room Air  
 
05/14 1930 97.6 58 20 110/70  97 Room Air  
 
05/14 1737 96.8 59 20 122/60  90 Room Air  
 
05/14 1530 96.2 60 20 102/60  98 Nasal  
 
       Cannula  
 
05/14 1414 97.2 68 20 110/70  100 Nasal  
 
       Cannula  
 
05/14 1200 95.5 60 18 118/68  100 Nasal 2.0L 
 
       Cannula  
 
05/14 1150      100 Nasal 2.0L 
 
       Cannula  
 
 
 Intake & Output
 
 
 05/15 0800 05/15 0000 05/14 1600 05/14 0800 05/14 0000 05/13 1600
 
Intake Total  1005 370   
 
Output Total  300 325   
 
Balance  705 45   
 
       
 
Intake, IV  525 250   
 
Intake, Oral  480 120   
 
Number   0   
 
Bowel      
 
Movements      
 
Output, Urine  300 325   
 
Patient   204 lb   
 
Weight      
 
Weight   Bed scale   
 
Measurement      
 
Method      
 
 
 
Physical Exam:
GEN- NAD
RESP- CLEAR
CARDIAC- RRR
ABD- SOFT, NT.  GROIN WOUNDS LOOK CLEAN AND DRY, NO SIGNIFICANT SWELLING OR 
ERYTHEMA. NO SIGNS OF HEMATOMA
EXT- WARM AND DRY, NO EDEMA. 2+ dp PULSE BILATERALLY
Current Medications:
 Current Medications
 
 
  Sig/Bonnie Start time  Last
 
Medication Dose Route Stop Time Status Admin
 
Acetaminophen 1,000 MG .STK-MED ONE 05/14 1721 DC 
 
  IV 05/14 1722  
 
Acetaminophen 1,000 MG Q6P PRN 05/14 1045 AC 
 
  IV   
 
Aspirin 81 MG DAILY 05/15 0900 CAN 
 
  PO   
 
Dextrose/Sodium  1,000 ML .[id number] 05/14 1045 AC 05/14
 
Chloride  IV   2354
 
Furosemide 20 MG DAILY 05/15 0900 AC 
 
  PO   
 
Gabapentin 100 MG BID 05/14 2100 AC 05/14
 
  PO   2100
 
Heparin Sodium  5,000 UNIT Q8 05/14 1400 DC 05/14
 
(Porcine)  SC   2100
 
Ondansetron HCl 4 MG Q6P PRN 05/14 1045 AC 
 
  IV   
 
Rivaroxaban 20 MG DAILY 05/15 0900 AC 
 
  PO   
 
Tamsulosin HCl 0.4 MG DAILY 05/15 0900 AC 
 
  PO   
 
Trazodone HCl 50 MG QPM 05/14 2100 AC 05/14
 
  PO   2100
 
 
 
 
Assessment/Plan
Assessment/Plan
83YO M SP EVAR POD1. STABLE.
 
PLAN TO DC TO HOME THIS MORNING AS LONG AS HE CAN AMBULATE WITH WALKER
FU WITH DR Hernandez IN 1-2 WEEKS
PER DR LYNN, PT NEEDS TO BE WEARING SOFT C-SPINE COLLAR AT ALL TIMES AND 
SHOULD FU WITH HER IN THE NEXT WEEK SOMETIME
CONT XARELTO
TYLENOL FOR PAIN AS NEEDED
 
 
Core Measures
 
Venous Thromboembolism
VTE Risk Factors Immobility
No Mechanical VTE Prophylaxis d/t Early Ambulation
No VTE Pharm Prophylaxis d/t Other